# Patient Record
(demographics unavailable — no encounter records)

---

## 2025-03-28 NOTE — HISTORY OF PRESENT ILLNESS
[FreeTextEntry1] : 3/28/25: At last visit, patient was started on topiramate and rizatriptan. Taking 1 tablet daily. She reports headaches are about half of what they were, down from 4 times a week to twice per week.  She reports severity is still the same, just the frequency has improved.  She reports noticing pimples in the skin in the same area as the headaches are located, they are superficial, no lumps under the skin.  Reports some blurry vision with headaches. Can sometimes feel her pulse in the temples.    8/27/24: 40-year-old woman right-handed with a history of hypertension with a history of chronic headaches which she says started from the age of 12 to 13 years old, moderate to severe more throbbing pounding, starts in the back of the head forward, involving the entire head, associated with blurry vision, feels dizzy lightheaded, nauseous occasionally will vomit. Occasional tingling the scalp. Headaches can last half a day, or can have a headache 4 days and at times of the last 2 weeks. Not triggered by her menstrual cycle, fatigue is likely the most common trigger. Unrelated to any food intake. She reports over the last 2 years the headaches become more frequent, more persistent, now occurring about twice a week. He thinks is related to a new job that she has for the last year where she works from a 11-8 shift at night. So her sleep has become more fragmented and more fatigue. No history of head injury or trauma. In the past she has gone to the local emergency room,-hospital. CT scan of the head has been performed over time, the last 1 being August of this year, which was unremarkable. No episodes of transient loss of vision, trouble speaking, unilateral weakness or numbness of the face or extremities. History of migraines in the family, her mother, her mother's mother, 1 child. Symptoms: dizziness, nausea, vomiting, photophobia, phonophobia, neck pain, numbness and tingling. Frequency: 2 times each week. Duration: > 4 hours. Patient reports a minimum pain level of 6/10 and a maximum pain level of 10/10. Overall, patient's activity level has increased. Total MIDAS Score: 21+, MIDAS Grade IV, Severe disability.

## 2025-03-28 NOTE — PHYSICAL EXAM
[FreeTextEntry1] : GENERAL PHYSICAL EXAM: GEN: no distress, normal affect EYES: sclera white, conjunctiva clear, no nystagmus PULM: no respiratory distress EXT: no edema, no cyanosis MSK: muscle tone and strength normal SKIN: warm, dry, no rash or lesion on exposed skin   NEUROLOGICAL EXAM: Mental Status Orientation: alert and oriented to person, place, time, and situation Language: clear and fluent, intact comprehension and repetition   Cranial Nerves II: visual fields full to confrontation III, IV, VI: PERRL, EOMI V, VII: facial sensation and movement intact and symmetric VIII: hearing intact IX, X: uvula midline, soft palate elevates normally XI: BL shoulder shrug intact XII: tongue midline   Motor Bilateral muscle strength 5/5 in UE and LE, proximally and distally Tone and bulk are normal in upper and lower limbs   Sensation Intact to light touch in all 4 EXTs   Coordination Normal FTN bilaterally   Gait Normal stance, stride, and pivot turn

## 2025-03-28 NOTE — ASSESSMENT
[FreeTextEntry1] : 40YO female, presenting to the office for follow-up visit today for migraine headaches.  Patient reports that these headaches have decreased with the topiramate about half, still getting at least twice a week.  She reports that with these headaches there is some blurry vision and a temporal pulse.  She reports that is often where the pain of the headache resides.  We discussed that while its highly unlikely this could potentially be something called temporal arteritis and would like to follow-up with blood work to rule out any inflammatory process.  Educated the patient that we would likely do an ultrasound of the temples should this lab work come back positive.  Exam otherwise unremarkable.  Plan: - Increase topiramate to 50 mg at bedtime daily for better prevention of migraine headaches. - Continue rizatriptan as needed for migraine treatment. - ESR to evaluate for possible temporal arteritis.  Should this come back positive we will inform the patient and send for ultrasound of the temples. - Follow-up in 3 months or sooner should the need arise.

## 2025-03-28 NOTE — REASON FOR VISIT
[Follow-Up: _____] : a [unfilled] follow-up visit [[facility] ] : provided by [employer] [facility]   [Time Spent: ____ minutes] : Total time spent using  services: [unfilled] minutes. The patient's primary language is not English thus required  services. [Interpreters_IDNumber] : [id number] [Interpreters_FullName] : Jose [TWNoteComboBox1] : Stateless